# Patient Record
Sex: FEMALE | Race: WHITE | NOT HISPANIC OR LATINO | Employment: OTHER | URBAN - NONMETROPOLITAN AREA
[De-identification: names, ages, dates, MRNs, and addresses within clinical notes are randomized per-mention and may not be internally consistent; named-entity substitution may affect disease eponyms.]

---

## 2024-06-05 ENCOUNTER — APPOINTMENT (OUTPATIENT)
Dept: RADIOLOGY | Facility: HOSPITAL | Age: 79
End: 2024-06-05
Payer: MEDICARE

## 2024-06-05 ENCOUNTER — APPOINTMENT (OUTPATIENT)
Dept: CARDIOLOGY | Facility: HOSPITAL | Age: 79
End: 2024-06-05
Payer: MEDICARE

## 2024-06-05 ENCOUNTER — HOSPITAL ENCOUNTER (EMERGENCY)
Facility: HOSPITAL | Age: 79
Discharge: NURSING, REHAB OR LONG-TERM CARE | End: 2024-06-05
Attending: EMERGENCY MEDICINE
Payer: MEDICARE

## 2024-06-05 VITALS
BODY MASS INDEX: 23.54 KG/M2 | DIASTOLIC BLOOD PRESSURE: 37 MMHG | RESPIRATION RATE: 16 BRPM | OXYGEN SATURATION: 98 % | SYSTOLIC BLOOD PRESSURE: 105 MMHG | HEART RATE: 87 BPM | WEIGHT: 150 LBS | HEIGHT: 67 IN | TEMPERATURE: 98.4 F

## 2024-06-05 DIAGNOSIS — S01.01XA LACERATION OF SCALP, INITIAL ENCOUNTER: ICD-10-CM

## 2024-06-05 DIAGNOSIS — S09.90XA HEAD INJURY, INITIAL ENCOUNTER: Primary | ICD-10-CM

## 2024-06-05 LAB
ALBUMIN SERPL BCP-MCNC: 3.8 G/DL (ref 3.4–5)
ALP SERPL-CCNC: 95 U/L (ref 33–136)
ALT SERPL W P-5'-P-CCNC: 3 U/L (ref 7–45)
ANION GAP SERPL CALC-SCNC: 12 MMOL/L (ref 10–20)
AST SERPL W P-5'-P-CCNC: 11 U/L (ref 9–39)
BASOPHILS # BLD AUTO: 0.04 X10*3/UL (ref 0–0.1)
BASOPHILS NFR BLD AUTO: 0.5 %
BILIRUB SERPL-MCNC: 0.6 MG/DL (ref 0–1.2)
BUN SERPL-MCNC: 24 MG/DL (ref 6–23)
CALCIUM SERPL-MCNC: 8.8 MG/DL (ref 8.6–10.3)
CHLORIDE SERPL-SCNC: 105 MMOL/L (ref 98–107)
CO2 SERPL-SCNC: 24 MMOL/L (ref 21–32)
CREAT SERPL-MCNC: 0.83 MG/DL (ref 0.5–1.05)
EGFRCR SERPLBLD CKD-EPI 2021: 71 ML/MIN/1.73M*2
EOSINOPHIL # BLD AUTO: 0.13 X10*3/UL (ref 0–0.4)
EOSINOPHIL NFR BLD AUTO: 1.8 %
ERYTHROCYTE [DISTWIDTH] IN BLOOD BY AUTOMATED COUNT: 14.4 % (ref 11.5–14.5)
GLUCOSE SERPL-MCNC: 122 MG/DL (ref 74–99)
HCT VFR BLD AUTO: 38 % (ref 36–46)
HGB BLD-MCNC: 11.9 G/DL (ref 12–16)
IMM GRANULOCYTES # BLD AUTO: 0.02 X10*3/UL (ref 0–0.5)
IMM GRANULOCYTES NFR BLD AUTO: 0.3 % (ref 0–0.9)
LYMPHOCYTES # BLD AUTO: 1.45 X10*3/UL (ref 0.8–3)
LYMPHOCYTES NFR BLD AUTO: 19.6 %
MAGNESIUM SERPL-MCNC: 2.09 MG/DL (ref 1.6–2.4)
MCH RBC QN AUTO: 27.7 PG (ref 26–34)
MCHC RBC AUTO-ENTMCNC: 31.3 G/DL (ref 32–36)
MCV RBC AUTO: 89 FL (ref 80–100)
MONOCYTES # BLD AUTO: 0.74 X10*3/UL (ref 0.05–0.8)
MONOCYTES NFR BLD AUTO: 10 %
NEUTROPHILS # BLD AUTO: 5.03 X10*3/UL (ref 1.6–5.5)
NEUTROPHILS NFR BLD AUTO: 67.8 %
NRBC BLD-RTO: 0 /100 WBCS (ref 0–0)
PLATELET # BLD AUTO: 290 X10*3/UL (ref 150–450)
POTASSIUM SERPL-SCNC: 3.9 MMOL/L (ref 3.5–5.3)
PROT SERPL-MCNC: 6 G/DL (ref 6.4–8.2)
RBC # BLD AUTO: 4.29 X10*6/UL (ref 4–5.2)
SODIUM SERPL-SCNC: 137 MMOL/L (ref 136–145)
WBC # BLD AUTO: 7.4 X10*3/UL (ref 4.4–11.3)

## 2024-06-05 PROCEDURE — 72125 CT NECK SPINE W/O DYE: CPT

## 2024-06-05 PROCEDURE — 2500000001 HC RX 250 WO HCPCS SELF ADMINISTERED DRUGS (ALT 637 FOR MEDICARE OP): Performed by: EMERGENCY MEDICINE

## 2024-06-05 PROCEDURE — 2500000005 HC RX 250 GENERAL PHARMACY W/O HCPCS

## 2024-06-05 PROCEDURE — 85025 COMPLETE CBC W/AUTO DIFF WBC: CPT | Performed by: EMERGENCY MEDICINE

## 2024-06-05 PROCEDURE — 93005 ELECTROCARDIOGRAM TRACING: CPT

## 2024-06-05 PROCEDURE — 73502 X-RAY EXAM HIP UNI 2-3 VIEWS: CPT | Mod: LEFT SIDE | Performed by: RADIOLOGY

## 2024-06-05 PROCEDURE — 36415 COLL VENOUS BLD VENIPUNCTURE: CPT | Performed by: EMERGENCY MEDICINE

## 2024-06-05 PROCEDURE — 90715 TDAP VACCINE 7 YRS/> IM: CPT | Performed by: EMERGENCY MEDICINE

## 2024-06-05 PROCEDURE — 83735 ASSAY OF MAGNESIUM: CPT | Performed by: EMERGENCY MEDICINE

## 2024-06-05 PROCEDURE — 70450 CT HEAD/BRAIN W/O DYE: CPT

## 2024-06-05 PROCEDURE — 80053 COMPREHEN METABOLIC PANEL: CPT | Performed by: EMERGENCY MEDICINE

## 2024-06-05 PROCEDURE — 70450 CT HEAD/BRAIN W/O DYE: CPT | Performed by: RADIOLOGY

## 2024-06-05 PROCEDURE — 72125 CT NECK SPINE W/O DYE: CPT | Performed by: RADIOLOGY

## 2024-06-05 PROCEDURE — 90471 IMMUNIZATION ADMIN: CPT | Performed by: EMERGENCY MEDICINE

## 2024-06-05 PROCEDURE — 2500000004 HC RX 250 GENERAL PHARMACY W/ HCPCS (ALT 636 FOR OP/ED): Performed by: EMERGENCY MEDICINE

## 2024-06-05 PROCEDURE — 73502 X-RAY EXAM HIP UNI 2-3 VIEWS: CPT | Mod: LT

## 2024-06-05 PROCEDURE — 99285 EMERGENCY DEPT VISIT HI MDM: CPT | Mod: 25

## 2024-06-05 PROCEDURE — 12002 RPR S/N/AX/GEN/TRNK2.6-7.5CM: CPT | Performed by: EMERGENCY MEDICINE

## 2024-06-05 RX ORDER — FLUOXETINE HYDROCHLORIDE 40 MG/1
1 CAPSULE ORAL EVERY OTHER DAY
COMMUNITY
Start: 2024-04-04

## 2024-06-05 RX ORDER — LORATADINE 10 MG/1
10 TABLET ORAL DAILY
COMMUNITY
Start: 2023-06-15

## 2024-06-05 RX ORDER — LIDOCAINE HYDROCHLORIDE AND EPINEPHRINE 10; 10 MG/ML; UG/ML
5 INJECTION, SOLUTION INFILTRATION; PERINEURAL ONCE
Status: COMPLETED | OUTPATIENT
Start: 2024-06-05 | End: 2024-06-05

## 2024-06-05 RX ORDER — FLUOXETINE 10 MG/1
10 CAPSULE ORAL
COMMUNITY
Start: 2024-06-04 | End: 2024-07-04

## 2024-06-05 RX ORDER — AMOXICILLIN 250 MG
1 CAPSULE ORAL 2 TIMES DAILY
COMMUNITY

## 2024-06-05 RX ORDER — ATORVASTATIN CALCIUM 20 MG/1
20 TABLET, FILM COATED ORAL
COMMUNITY
Start: 2023-08-23

## 2024-06-05 RX ORDER — DILTIAZEM HYDROCHLORIDE 240 MG/1
240 CAPSULE, COATED, EXTENDED RELEASE ORAL
COMMUNITY
Start: 2024-04-03

## 2024-06-05 RX ORDER — ACETAMINOPHEN, DIPHENHYDRAMINE HCL, PHENYLEPHRINE HCL 325; 25; 5 MG/1; MG/1; MG/1
10 TABLET ORAL DAILY PRN
COMMUNITY
Start: 2023-10-24

## 2024-06-05 RX ORDER — OLANZAPINE 10 MG/1
10 TABLET, ORALLY DISINTEGRATING ORAL
COMMUNITY
Start: 2024-06-03 | End: 2024-07-03

## 2024-06-05 RX ORDER — ASPIRIN 81 MG/1
81 TABLET ORAL
COMMUNITY

## 2024-06-05 RX ORDER — CHOLECALCIFEROL (VITAMIN D3) 50 MCG
400 TABLET ORAL
COMMUNITY

## 2024-06-05 RX ORDER — LIDOCAINE HYDROCHLORIDE AND EPINEPHRINE 10; 10 MG/ML; UG/ML
INJECTION, SOLUTION INFILTRATION; PERINEURAL
Status: COMPLETED
Start: 2024-06-05 | End: 2024-06-05

## 2024-06-05 RX ORDER — BACITRACIN ZINC 500 UNIT/G
1 OINTMENT IN PACKET (EA) TOPICAL ONCE
Status: COMPLETED | OUTPATIENT
Start: 2024-06-05 | End: 2024-06-05

## 2024-06-05 RX ADMIN — BACITRACIN ZINC 1 APPLICATION: 500 OINTMENT TOPICAL at 05:59

## 2024-06-05 RX ADMIN — TETANUS TOXOID, REDUCED DIPHTHERIA TOXOID AND ACELLULAR PERTUSSIS VACCINE, ADSORBED 0.5 ML: 5; 2.5; 8; 8; 2.5 SUSPENSION INTRAMUSCULAR at 09:22

## 2024-06-05 RX ADMIN — LIDOCAINE HYDROCHLORIDE AND EPINEPHRINE 5 ML: 10; 10 INJECTION, SOLUTION INFILTRATION; PERINEURAL at 04:21

## 2024-06-05 ASSESSMENT — PAIN - FUNCTIONAL ASSESSMENT: PAIN_FUNCTIONAL_ASSESSMENT: UNABLE TO SELF-REPORT

## 2024-06-05 NOTE — ED PROVIDER NOTES
HPI   Chief Complaint   Patient presents with   • Fall     Pt comes in for a fall. Pt is slightly non verbal, but is able to answer question yes or no. Pt does know that she fell tonight and points to her left thigh when asked if anything hurts. Pt does endorse light headed and dizziness. Pt does us a walker at the facility she is from. Laceration to the back of the head.        79-year-old female presents by ambulance from a nursing home after she fell hitting her head on a dresser.  Has a 3 cm laceration to the posterior left parietal region.  Patient is at her baseline.  Is complaining of some lightheadedness and pain in the left thigh.  Patient is at her baseline.  Patient is started hospice on Monday.  Patient will have a repeat CAT scan 4 hours after the original one because she is on Xarelto 20 mg.                        No data recorded                   Patient History   No past medical history on file.  No past surgical history on file.  No family history on file.  Social History     Tobacco Use   • Smoking status: Not on file   • Smokeless tobacco: Not on file   Substance Use Topics   • Alcohol use: Not on file   • Drug use: Not on file       Physical Exam   ED Triage Vitals [06/05/24 0335]   Temperature Heart Rate Respirations BP   36.9 °C (98.4 °F) 76 18 148/62      Pulse Ox Temp Source Heart Rate Source Patient Position   99 % Temporal Monitor Sitting      BP Location FiO2 (%)     Left arm --       Physical Exam  Vitals and nursing note reviewed.   HENT:      Head:      Comments: Stellate 5 cm laceration involving the right posterior temporal region  Eyes:      Extraocular Movements: Extraocular movements intact.      Pupils: Pupils are equal, round, and reactive to light.   Cardiovascular:      Rate and Rhythm: Normal rate.   Pulmonary:      Effort: Pulmonary effort is normal.   Skin:     General: Skin is warm and dry.   Neurological:      Mental Status: Mental status is at baseline.     Labs Reviewed    CBC WITH AUTO DIFFERENTIAL - Abnormal       Result Value    WBC 7.4      nRBC 0.0      RBC 4.29      Hemoglobin 11.9 (*)     Hematocrit 38.0      MCV 89      MCH 27.7      MCHC 31.3 (*)     RDW 14.4      Platelets 290      Neutrophils % 67.8      Immature Granulocytes %, Automated 0.3      Lymphocytes % 19.6      Monocytes % 10.0      Eosinophils % 1.8      Basophils % 0.5      Neutrophils Absolute 5.03      Immature Granulocytes Absolute, Automated 0.02      Lymphocytes Absolute 1.45      Monocytes Absolute 0.74      Eosinophils Absolute 0.13      Basophils Absolute 0.04     COMPREHENSIVE METABOLIC PANEL - Abnormal    Glucose 122 (*)     Sodium 137      Potassium 3.9      Chloride 105      Bicarbonate 24      Anion Gap 12      Urea Nitrogen 24 (*)     Creatinine 0.83      eGFR 71      Calcium 8.8      Albumin 3.8      Alkaline Phosphatase 95      Total Protein 6.0 (*)     AST 11      Bilirubin, Total 0.6      ALT 3 (*)    MAGNESIUM - Normal    Magnesium 2.09        CT cervical spine wo IV contrast   Final Result   No acute intracranial hemorrhage or mass effect.        No acute fracture or traumatic subluxation of the cervical spine.        Degenerative changes and demineralization.        MACRO:   None.        Signed by: Karen Wesley 6/5/2024 4:35 AM   Dictation workstation:   [id number]      CT head wo IV contrast   Final Result   No acute intracranial hemorrhage or mass effect.        No acute fracture or traumatic subluxation of the cervical spine.        Degenerative changes and demineralization.        MACRO:   None.        Signed by: Karen Wesley 6/5/2024 4:35 AM   Dictation workstation:   [id number]      XR hip left with pelvis when performed 2 or 3 views    (Results Pending)      ED Course & MDM   ED Course as of 06/05/24 0657   Wed Jun 05, 2024   0357 Twelve-lead EKG interpreted by myself at 0340 1 normal sinus rhythm 2 no ectopy 3 normal axis [MS]      ED Course User Index  [MS] Joaquin Sarkar,           Diagnoses as of 06/05/24 0657   Head injury, initial encounter   Laceration of scalp, initial encounter       Medical Decision Making      Procedure  Laceration Repair    Performed by: Joaquin Sarkar DO  Authorized by: Joaquin Sarkar DO    Consent:     Consent obtained:  Verbal    Consent given by:  Patient    Risks discussed:  Infection  Universal protocol:     Procedure explained and questions answered to patient or proxy's satisfaction: yes      Relevant documents present and verified: yes      Patient identity confirmed:  Verbally with patient  Anesthesia:     Anesthesia method:  Local infiltration    Local anesthetic:  Lidocaine 1% w/o epi  Laceration details:     Location:  Scalp    Scalp location:  Occipital    Length (cm):  5    Depth (mm):  4  Pre-procedure details:     Preparation:  Patient was prepped and draped in usual sterile fashion and imaging obtained to evaluate for foreign bodies  Exploration:     Contaminated: no    Treatment:     Area cleansed with:  Chlorhexidine    Amount of cleaning:  Extensive    Visualized foreign bodies/material removed: no      Debridement:  None    Undermining:  None  Skin repair:     Repair method:  Staples    Number of staples:  9  Approximation:     Approximation:  Close  Repair type:     Repair type:  Simple  Post-procedure details:     Dressing:  Antibiotic ointment    Procedure completion:  Tolerated       Joaquin Sarkar DO  06/06/24 0527

## 2024-06-05 NOTE — PROGRESS NOTES
Emergency Medicine Transition of Care Note.    I received Johnna Hair in signout from Dr. dawson.  Please see the previous ED provider note for all HPI, PE and MDM up to the time of signout at 7am. This is in addition to the primary record.    In brief Johnna Hair is an 79 y.o. female presenting for   Chief Complaint   Patient presents with    Fall     Pt comes in for a fall. Pt is slightly non verbal, but is able to answer question yes or no. Pt does know that she fell tonight and points to her left thigh when asked if anything hurts. Pt does endorse light headed and dizziness. Pt does use a walker at the facility she is from. Laceration to the back of the head. No nurses on staff at the facility to get report from.     At the time of signout we were awaiting: repeat head ct    ED Course as of 06/05/24 0854   Wed Jun 05, 2024   0357 Twelve-lead EKG interpreted by myself at 0340 1 normal sinus rhythm 2 no ectopy 3 normal axis [MS]      ED Course User Index  [MS] Joaquin Dawson,          Diagnoses as of 06/05/24 0854   Head injury, initial encounter   Laceration of scalp, initial encounter       Medical Decision Making  Patient signed out to me Dr. Dawson awaiting repeat CT scan 4 hours after original.  CT scan was negative of the brain.  Plain films of the hip and pelvis were also negative.  Patient received a dressing antibiotic ointment and a tetanus prophylaxis and will have staples removed in approximately 7 to 10 days        Final diagnoses:   [S09.90XA] Head injury, initial encounter   [S01.01XA] Laceration of scalp, initial encounter           Procedure  Procedures    Arcadio Block MD

## 2024-06-14 LAB
ATRIAL RATE: 76 BPM
P AXIS: 64 DEGREES
P OFFSET: 211 MS
P ONSET: 149 MS
PR INTERVAL: 142 MS
Q ONSET: 220 MS
QRS COUNT: 12 BEATS
QRS DURATION: 72 MS
QT INTERVAL: 384 MS
QTC CALCULATION(BAZETT): 432 MS
QTC FREDERICIA: 415 MS
R AXIS: 57 DEGREES
T AXIS: 44 DEGREES
T OFFSET: 412 MS
VENTRICULAR RATE: 76 BPM